# Patient Record
Sex: FEMALE | Race: WHITE | NOT HISPANIC OR LATINO | Employment: FULL TIME | URBAN - METROPOLITAN AREA
[De-identification: names, ages, dates, MRNs, and addresses within clinical notes are randomized per-mention and may not be internally consistent; named-entity substitution may affect disease eponyms.]

---

## 2023-03-29 ENCOUNTER — OFFICE VISIT (OUTPATIENT)
Dept: PRIMARY CARE | Facility: CLINIC | Age: 23
End: 2023-03-29
Payer: COMMERCIAL

## 2023-03-29 VITALS
HEART RATE: 80 BPM | OXYGEN SATURATION: 98 % | HEIGHT: 66 IN | TEMPERATURE: 97 F | WEIGHT: 246 LBS | BODY MASS INDEX: 39.53 KG/M2 | DIASTOLIC BLOOD PRESSURE: 56 MMHG | SYSTOLIC BLOOD PRESSURE: 108 MMHG

## 2023-03-29 DIAGNOSIS — R53.83 OTHER FATIGUE: ICD-10-CM

## 2023-03-29 DIAGNOSIS — E66.01 CLASS 3 SEVERE OBESITY DUE TO EXCESS CALORIES WITHOUT SERIOUS COMORBIDITY WITH BODY MASS INDEX (BMI) OF 40.0 TO 44.9 IN ADULT (MULTI): ICD-10-CM

## 2023-03-29 DIAGNOSIS — Z00.00 WELL ADULT EXAM: Primary | ICD-10-CM

## 2023-03-29 PROBLEM — R00.2 PALPITATIONS: Status: ACTIVE | Noted: 2023-03-29

## 2023-03-29 PROBLEM — R49.0 DYSPHONIA: Status: ACTIVE | Noted: 2023-03-29

## 2023-03-29 PROBLEM — E66.9 OBESITY: Status: ACTIVE | Noted: 2023-03-29

## 2023-03-29 PROBLEM — M54.2 DORSALGIA OF CERVICOTHORACIC REGION: Status: ACTIVE | Noted: 2023-03-29

## 2023-03-29 PROBLEM — R30.0 DYSURIA: Status: ACTIVE | Noted: 2023-03-29

## 2023-03-29 PROBLEM — N89.8 VAGINAL DISCHARGE: Status: ACTIVE | Noted: 2023-03-29

## 2023-03-29 PROBLEM — L08.9 ABRASIONS OF MULTIPLE SITES WITH INFECTION: Status: ACTIVE | Noted: 2023-03-29

## 2023-03-29 PROBLEM — M25.562 LEFT KNEE PAIN: Status: ACTIVE | Noted: 2023-03-29

## 2023-03-29 PROBLEM — G56.01 CARPAL TUNNEL SYNDROME OF RIGHT WRIST: Status: ACTIVE | Noted: 2023-03-29

## 2023-03-29 PROBLEM — K21.9 GERD (GASTROESOPHAGEAL REFLUX DISEASE): Status: ACTIVE | Noted: 2023-03-29

## 2023-03-29 PROBLEM — M54.50 DORSALGIA OF LUMBOSACRAL REGION: Status: ACTIVE | Noted: 2023-03-29

## 2023-03-29 PROBLEM — E03.9 HYPOTHYROIDISM: Status: ACTIVE | Noted: 2023-03-29

## 2023-03-29 PROBLEM — R29.898 WEAKNESS OF BOTH HANDS: Status: ACTIVE | Noted: 2023-03-29

## 2023-03-29 PROBLEM — N94.19 FUNCTIONAL DYSPAREUNIA: Status: ACTIVE | Noted: 2023-03-29

## 2023-03-29 PROBLEM — O21.9 NAUSEA AND VOMITING OF PREGNANCY, ANTEPARTUM (HHS-HCC): Status: ACTIVE | Noted: 2023-03-29

## 2023-03-29 PROBLEM — T14.8XXA WOUND INFECTION: Status: ACTIVE | Noted: 2023-03-29

## 2023-03-29 PROBLEM — T07.XXXA ABRASIONS OF MULTIPLE SITES WITH INFECTION: Status: ACTIVE | Noted: 2023-03-29

## 2023-03-29 PROBLEM — M54.9 BACK PAIN DURING PREGNANCY IN THIRD TRIMESTER (HHS-HCC): Status: ACTIVE | Noted: 2023-03-29

## 2023-03-29 PROBLEM — S93.409A ANKLE SPRAIN: Status: ACTIVE | Noted: 2023-03-29

## 2023-03-29 PROBLEM — G89.29 CHRONIC MIDLINE LOW BACK PAIN WITHOUT SCIATICA: Status: ACTIVE | Noted: 2023-03-29

## 2023-03-29 PROBLEM — S76.111S: Status: ACTIVE | Noted: 2023-03-29

## 2023-03-29 PROBLEM — R06.02 SHORTNESS OF BREATH ON EXERTION: Status: ACTIVE | Noted: 2023-03-29

## 2023-03-29 PROBLEM — N89.6 TIGHT INTROITUS: Status: ACTIVE | Noted: 2023-03-29

## 2023-03-29 PROBLEM — J45.909 ASTHMA (HHS-HCC): Status: ACTIVE | Noted: 2023-03-29

## 2023-03-29 PROBLEM — N92.6 LATE MENSES: Status: ACTIVE | Noted: 2023-03-29

## 2023-03-29 PROBLEM — N12: Status: ACTIVE | Noted: 2023-03-29

## 2023-03-29 PROBLEM — M25.561 RIGHT KNEE PAIN: Status: ACTIVE | Noted: 2023-03-29

## 2023-03-29 PROBLEM — R20.0 NUMBNESS AND TINGLING IN BOTH HANDS: Status: ACTIVE | Noted: 2023-03-29

## 2023-03-29 PROBLEM — R29.898 IMPAIRED FLEXIBILITY OF LOWER EXTREMITY: Status: ACTIVE | Noted: 2023-03-29

## 2023-03-29 PROBLEM — J38.5 LARYNGOSPASM: Status: ACTIVE | Noted: 2023-03-29

## 2023-03-29 PROBLEM — M54.6 THORACIC BACK PAIN: Status: ACTIVE | Noted: 2023-03-29

## 2023-03-29 PROBLEM — M54.6 DORSALGIA OF CERVICOTHORACIC REGION: Status: ACTIVE | Noted: 2023-03-29

## 2023-03-29 PROBLEM — L08.9 WOUND INFECTION: Status: ACTIVE | Noted: 2023-03-29

## 2023-03-29 PROBLEM — R07.9 CHEST PAIN: Status: ACTIVE | Noted: 2023-03-29

## 2023-03-29 PROBLEM — G56.02 CARPAL TUNNEL SYNDROME OF LEFT WRIST: Status: ACTIVE | Noted: 2023-03-29

## 2023-03-29 PROBLEM — R20.2 NUMBNESS AND TINGLING IN BOTH HANDS: Status: ACTIVE | Noted: 2023-03-29

## 2023-03-29 PROBLEM — F32.A DEPRESSION: Status: ACTIVE | Noted: 2023-03-29

## 2023-03-29 PROBLEM — R10.9 ABDOMINAL PAIN: Status: ACTIVE | Noted: 2023-03-29

## 2023-03-29 PROBLEM — M62.81 TRUNCAL MUSCLE WEAKNESS: Status: ACTIVE | Noted: 2023-03-29

## 2023-03-29 PROBLEM — N92.6 IRREGULAR MENSES: Status: ACTIVE | Noted: 2023-03-29

## 2023-03-29 PROBLEM — O26.843 UTERINE SIZE-DATE DISCREPANCY IN THIRD TRIMESTER (HHS-HCC): Status: ACTIVE | Noted: 2023-03-29

## 2023-03-29 PROBLEM — O26.893 BACK PAIN DURING PREGNANCY IN THIRD TRIMESTER (HHS-HCC): Status: ACTIVE | Noted: 2023-03-29

## 2023-03-29 PROBLEM — M99.09 SEGMENTAL AND SOMATIC DYSFUNCTION: Status: ACTIVE | Noted: 2023-03-29

## 2023-03-29 PROBLEM — N64.4 BREAST PAIN: Status: ACTIVE | Noted: 2023-03-29

## 2023-03-29 PROBLEM — J32.9 SINUSITIS: Status: ACTIVE | Noted: 2023-03-29

## 2023-03-29 PROBLEM — N76.0 VULVOVAGINITIS: Status: ACTIVE | Noted: 2023-03-29

## 2023-03-29 PROBLEM — R11.2 NAUSEA WITH VOMITING: Status: ACTIVE | Noted: 2023-03-29

## 2023-03-29 PROBLEM — R42 DIZZINESS: Status: ACTIVE | Noted: 2023-03-29

## 2023-03-29 PROBLEM — R06.00 DYSPNEA: Status: ACTIVE | Noted: 2023-03-29

## 2023-03-29 PROBLEM — M54.50 CHRONIC MIDLINE LOW BACK PAIN WITHOUT SCIATICA: Status: ACTIVE | Noted: 2023-03-29

## 2023-03-29 PROBLEM — R05.9 COUGH: Status: ACTIVE | Noted: 2023-03-29

## 2023-03-29 PROCEDURE — 4004F PT TOBACCO SCREEN RCVD TLK: CPT | Performed by: FAMILY MEDICINE

## 2023-03-29 PROCEDURE — 3008F BODY MASS INDEX DOCD: CPT | Performed by: FAMILY MEDICINE

## 2023-03-29 PROCEDURE — 99395 PREV VISIT EST AGE 18-39: CPT | Performed by: FAMILY MEDICINE

## 2023-03-29 RX ORDER — ALBUTEROL SULFATE 90 UG/1
AEROSOL, METERED RESPIRATORY (INHALATION)
COMMUNITY
Start: 2021-11-09 | End: 2023-10-30 | Stop reason: ALTCHOICE

## 2023-03-29 ASSESSMENT — PATIENT HEALTH QUESTIONNAIRE - PHQ9
2. FEELING DOWN, DEPRESSED OR HOPELESS: MORE THAN HALF THE DAYS
SUM OF ALL RESPONSES TO PHQ9 QUESTIONS 1 AND 2: 2
1. LITTLE INTEREST OR PLEASURE IN DOING THINGS: NOT AT ALL
10. IF YOU CHECKED OFF ANY PROBLEMS, HOW DIFFICULT HAVE THESE PROBLEMS MADE IT FOR YOU TO DO YOUR WORK, TAKE CARE OF THINGS AT HOME, OR GET ALONG WITH OTHER PEOPLE: SOMEWHAT DIFFICULT

## 2023-03-29 NOTE — PROGRESS NOTES
Subjective   Patient ID: Magdalena Guillory is a 22 y.o. female who presents for Annual Exam (Work physical).  HPI  Insert concerns today:None    In general the patient states that her health is:Good    Regular dental visits:Needs to see dentst  Vision problems: no problems  Hearing loss:no problems    Diet:Trying well balance  Exercise:Started daily  Weight concerns:Yes    Contraception:iUD  Sexual activity:yes  Menstrual problems:none  Pregnancy history:     Cervical cancer screening:  Last Pap and pelvic was done this year and has IUD  No history of abnormal Pap smear    Breast cancer screening:  Regular self breast exams:yes  Any changes in the breast:no       Do you feel safe at home?:yes  Review of Systems  No other complaints  Objective   Physical Exam  General: Patient is alert and oriented ×3 and appears in no acute distress. No respiratory distress.    Head: Atraumatic normocephalic.    Eyes: EOMI, PERRLA      Ears: Canals patent without any irritation, tympanic membranes without inflammation, no swelling, normal light reflex.    Nose: Nares patent. Turbinates are not swollen. No discharge.    Mouth: Normal mucosa. Moist. No erythema, exudates, tonsillar enlargement.    Neck: Normal range of motion, no masses.  Thyroid is palpable and normal in size without any nodules. No anterior cervical or posterior cervical adenopathy.    Heart: Regular rate and rhythm, no murmurs clicks or gallops    Lungs: Clear to auscultation bilaterally without any rhonchi rales or wheezing, lung sounds heard throughout all lung fields    Abdomen: Soft, nontender, no rigidity, rebound, guarding or organomegaly. Bowel sounds ×4 quadrants.    Musculoskeletal: Normal range of motion, strength is grossly intact in the proximal distal muscles of the upper and lower extremities bilaterally, deep tendon reflexes +2 out of 4 and symmetric bilaterally at the patella, Achilles, biceps, triceps, sensation intact.    Nerves: Cranial nerves  II through XII appear grossly intact and without deficit    Skin: Intact, dry, no rashes or erythema    Psych: Normal affect.   Assessment/Plan   Problem List Items Addressed This Visit       Obesity     Other Visit Diagnoses       Well adult exam    -  Primary    Relevant Orders    Lipid Panel    Vitamin D 1,25 Dihydroxy    Other fatigue        Relevant Orders    CBC and Auto Differential    Comprehensive Metabolic Panel    Lipid Panel    Triiodothyronine, Free    Thyroxine, Free    TSH with reflex to Free T4 if abnormal    Vitamin B12    Vitamin D 1,25 Dihydroxy        The patient is a well 22-year-old female  Anticipatory guidance given  Labs were ordered  Discussed morbid obesity.  Patient is patient is working on exercising now and diet.

## 2023-04-06 ENCOUNTER — TELEPHONE (OUTPATIENT)
Dept: PRIMARY CARE | Facility: CLINIC | Age: 23
End: 2023-04-06

## 2023-04-06 ENCOUNTER — APPOINTMENT (OUTPATIENT)
Dept: LAB | Facility: LAB | Age: 23
End: 2023-04-06
Payer: COMMERCIAL

## 2023-04-06 NOTE — TELEPHONE ENCOUNTER
Pt called states she went to [facility] to get her labs done and they couldn't do them said the dr placed them external and not internal. Can you please call her and email her the current orders or fax them to facility. Let her know.

## 2023-04-11 NOTE — TELEPHONE ENCOUNTER
Printed them up and called and asked Magdalena where she wants me to send them. I am faxing to the [facility]

## 2023-04-14 ENCOUNTER — APPOINTMENT (OUTPATIENT)
Dept: LAB | Facility: LAB | Age: 23
End: 2023-04-14
Payer: COMMERCIAL

## 2023-04-14 LAB
ALANINE AMINOTRANSFERASE (SGPT) (U/L) IN SER/PLAS: 50 U/L (ref 7–45)
ALBUMIN (G/DL) IN SER/PLAS: 4.8 G/DL (ref 3.4–5)
ALKALINE PHOSPHATASE (U/L) IN SER/PLAS: 89 U/L (ref 33–110)
ANION GAP IN SER/PLAS: 15 MMOL/L (ref 10–20)
ASPARTATE AMINOTRANSFERASE (SGOT) (U/L) IN SER/PLAS: 26 U/L (ref 9–39)
BASOPHILS (10*3/UL) IN BLOOD BY AUTOMATED COUNT: 0.03 X10E9/L (ref 0–0.1)
BASOPHILS/100 LEUKOCYTES IN BLOOD BY AUTOMATED COUNT: 0.3 % (ref 0–2)
BILIRUBIN TOTAL (MG/DL) IN SER/PLAS: 0.7 MG/DL (ref 0–1.2)
CALCIUM (MG/DL) IN SER/PLAS: 9.7 MG/DL (ref 8.6–10.3)
CARBON DIOXIDE, TOTAL (MMOL/L) IN SER/PLAS: 22 MMOL/L (ref 21–32)
CHLORIDE (MMOL/L) IN SER/PLAS: 105 MMOL/L (ref 98–107)
CHOLESTEROL (MG/DL) IN SER/PLAS: 199 MG/DL (ref 0–199)
CHOLESTEROL IN HDL (MG/DL) IN SER/PLAS: 32.7 MG/DL
CHOLESTEROL/HDL RATIO: 6.1
COBALAMIN (VITAMIN B12) (PG/ML) IN SER/PLAS: 336 PG/ML (ref 211–911)
CREATININE (MG/DL) IN SER/PLAS: 0.56 MG/DL (ref 0.5–1.05)
EOSINOPHILS (10*3/UL) IN BLOOD BY AUTOMATED COUNT: 0.07 X10E9/L (ref 0–0.7)
EOSINOPHILS/100 LEUKOCYTES IN BLOOD BY AUTOMATED COUNT: 0.7 % (ref 0–6)
ERYTHROCYTE DISTRIBUTION WIDTH (RATIO) BY AUTOMATED COUNT: 13 % (ref 11.5–14.5)
ERYTHROCYTE MEAN CORPUSCULAR HEMOGLOBIN CONCENTRATION (G/DL) BY AUTOMATED: 32.4 G/DL (ref 32–36)
ERYTHROCYTE MEAN CORPUSCULAR VOLUME (FL) BY AUTOMATED COUNT: 79 FL (ref 80–100)
ERYTHROCYTES (10*6/UL) IN BLOOD BY AUTOMATED COUNT: 5.17 X10E12/L (ref 4–5.2)
GFR FEMALE: >90 ML/MIN/1.73M2
GLUCOSE (MG/DL) IN SER/PLAS: 96 MG/DL (ref 74–99)
HEMATOCRIT (%) IN BLOOD BY AUTOMATED COUNT: 40.8 % (ref 36–46)
HEMOGLOBIN (G/DL) IN BLOOD: 13.2 G/DL (ref 12–16)
IMMATURE GRANULOCYTES/100 LEUKOCYTES IN BLOOD BY AUTOMATED COUNT: 0.6 % (ref 0–0.9)
LDL: 115 MG/DL (ref 0–119)
LEUKOCYTES (10*3/UL) IN BLOOD BY AUTOMATED COUNT: 9.9 X10E9/L (ref 4.4–11.3)
LYMPHOCYTES (10*3/UL) IN BLOOD BY AUTOMATED COUNT: 2.04 X10E9/L (ref 1.2–4.8)
LYMPHOCYTES/100 LEUKOCYTES IN BLOOD BY AUTOMATED COUNT: 20.6 % (ref 13–44)
MONOCYTES (10*3/UL) IN BLOOD BY AUTOMATED COUNT: 0.61 X10E9/L (ref 0.1–1)
MONOCYTES/100 LEUKOCYTES IN BLOOD BY AUTOMATED COUNT: 6.1 % (ref 2–10)
NEUTROPHILS (10*3/UL) IN BLOOD BY AUTOMATED COUNT: 7.11 X10E9/L (ref 1.2–7.7)
NEUTROPHILS/100 LEUKOCYTES IN BLOOD BY AUTOMATED COUNT: 71.7 % (ref 40–80)
NON HDL CHOLESTEROL: 166 MG/DL (ref 0–149)
PLATELETS (10*3/UL) IN BLOOD AUTOMATED COUNT: 338 X10E9/L (ref 150–450)
POTASSIUM (MMOL/L) IN SER/PLAS: 3.5 MMOL/L (ref 3.5–5.3)
PROTEIN TOTAL: 7.5 G/DL (ref 6.4–8.2)
SODIUM (MMOL/L) IN SER/PLAS: 138 MMOL/L (ref 136–145)
THYROTROPIN (MIU/L) IN SER/PLAS BY DETECTION LIMIT <= 0.05 MIU/L: 2.29 MIU/L (ref 0.44–3.98)
THYROXINE (T4) FREE (NG/DL) IN SER/PLAS: 0.96 NG/DL (ref 0.61–1.12)
TRIGLYCERIDE (MG/DL) IN SER/PLAS: 256 MG/DL (ref 0–149)
TRIIODOTHYRONINE (T3) FREE (PG/ML) IN SER/PLAS: 4.1 PG/ML (ref 2.3–4.2)
UREA NITROGEN (MG/DL) IN SER/PLAS: 11 MG/DL (ref 6–23)
VLDL: 51 MG/DL (ref 0–40)

## 2023-04-17 ENCOUNTER — TELEPHONE (OUTPATIENT)
Dept: PRIMARY CARE | Facility: CLINIC | Age: 23
End: 2023-04-17
Payer: COMMERCIAL

## 2023-04-17 LAB — VITAMIN D 1,25-DIHYDROXY: 59.1 PG/ML (ref 19.9–79.3)

## 2023-04-17 NOTE — TELEPHONE ENCOUNTER
Patient called about lab results, apparently her lipids are out of range. She did make an appt this week to discuss with you but didn't know if you could review them and if she would even need an appt?

## 2023-04-17 NOTE — TELEPHONE ENCOUNTER
----- Message from Noel Kelly DO sent at 4/16/2023  9:18 PM EDT -----  Please let the patient know that her blood count was normal but it looks like she may be heading towards iron deficiency, B12 was on the low end of normal and I would suggest taking B12, thyroid was normal, cholesterol was elevated and I would suggest decreasing breads, pastas and sweets.

## 2023-04-17 NOTE — RESULT ENCOUNTER NOTE
Please let the patient know that her blood count was normal but it looks like she may be heading towards iron deficiency, B12 was on the low end of normal and I would suggest taking B12, thyroid was normal, cholesterol was elevated and I would suggest decreasing breads, pastas and sweets.

## 2023-04-17 NOTE — TELEPHONE ENCOUNTER
Magdalena left a message on rx line, she would like to schedule an appointment to follow up with Dr Kelly regarding blood work results.

## 2023-04-17 NOTE — PROGRESS NOTES
Pt states that she drank a cherry coke right before her getting her blood drawn, she totally forgot not to drink before hand. She states she didn't eat 2 days before and was needing to get something in her system.

## 2023-04-19 ENCOUNTER — APPOINTMENT (OUTPATIENT)
Dept: PRIMARY CARE | Facility: CLINIC | Age: 23
End: 2023-04-19
Payer: COMMERCIAL

## 2023-08-03 ENCOUNTER — OFFICE VISIT (OUTPATIENT)
Dept: PRIMARY CARE | Facility: CLINIC | Age: 23
End: 2023-08-03
Payer: COMMERCIAL

## 2023-08-03 VITALS
BODY MASS INDEX: 36.8 KG/M2 | WEIGHT: 229 LBS | OXYGEN SATURATION: 98 % | DIASTOLIC BLOOD PRESSURE: 68 MMHG | SYSTOLIC BLOOD PRESSURE: 106 MMHG | HEART RATE: 70 BPM | HEIGHT: 66 IN

## 2023-08-03 DIAGNOSIS — M25.531 RIGHT WRIST PAIN: Primary | ICD-10-CM

## 2023-08-03 PROBLEM — M25.579 ANKLE PAIN: Status: ACTIVE | Noted: 2022-09-15
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
## 2023-08-03 PROCEDURE — 4004F PT TOBACCO SCREEN RCVD TLK: CPT | Performed by: FAMILY MEDICINE

## 2023-08-03 PROCEDURE — 3008F BODY MASS INDEX DOCD: CPT | Performed by: FAMILY MEDICINE

## 2023-08-03 PROCEDURE — 99213 OFFICE O/P EST LOW 20 MIN: CPT | Performed by: FAMILY MEDICINE

## 2023-08-03 RX ORDER — NAPROXEN 500 MG/1
500 TABLET ORAL 2 TIMES DAILY PRN
Qty: 28 TABLET | Refills: 0 | Status: SHIPPED | OUTPATIENT
Start: 2023-08-03 | End: 2023-08-17

## 2023-08-03 RX ORDER — SERTRALINE HYDROCHLORIDE 20 MG/ML
SOLUTION ORAL EVERY 24 HOURS
COMMUNITY
End: 2023-10-30 | Stop reason: ALTCHOICE

## 2023-08-03 NOTE — LETTER
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  On Monday 8/21/23 can restart normal activities.    If you have any questions or concerns, please don't hesitate to call.         Sincerely,        Noel Kelly, DO    CC: No Recipients

## 2023-08-03 NOTE — PROGRESS NOTES
Subjective   Patient ID: Magdalena Guillory is a 22 y.o. female who presents for Wrist Pain.  HPI  Having right wrist pain.  States it hurts towards the 5th digit.  Not getting tingling any more. 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 She notices it worse after work and at night.  Tylenol and ibuprofen don't helep.  Icing.      Review of Systems    Objective   Physical Exam  General: Patient is alert and orient x3 appears in no acute distress    Heart: Regular rate and rhythm no murmurs clicks or gallops    Lungs: Clear to auscultation bilateral without [place] wheezing    Extremities: No cyanosis clubbing or edema    Musculoskeletal: Strength is grossly intact at 5 out of 5, normal range of motion in the right wrist, there is some tenderness, no swelling  Assessment/Plan   Problem List Items Addressed This Visit    None  Visit Diagnoses       Right wrist pain    -  Primary    Relevant Medications    naproxen (Naprosyn) 500 mg tablet    Other Relevant Orders    XR wrist right 3+ views (Completed)

## 2023-08-07 ENCOUNTER — TELEPHONE (OUTPATIENT)
Dept: PRIMARY CARE | Facility: CLINIC | Age: 23
End: 2023-08-07
Payer: COMMERCIAL

## 2023-08-07 NOTE — TELEPHONE ENCOUNTER
----- Message from Noel Kelly DO sent at 8/6/2023  6:15 PM EDT -----  Pleaset let the patient know that the Xray of the wrist was normal

## 2023-08-08 NOTE — TELEPHONE ENCOUNTER
----- Message from Nicky Kim RN sent at 8/8/2023  8:12 AM EDT -----    ----- Message -----  From: Noel Kelly DO  Sent: 8/6/2023   6:15 PM EDT  To: Katie Yarbrough MA    Pleaset let the patient know that the Xray of the wrist was normal

## 2023-10-05 ENCOUNTER — TELEMEDICINE (OUTPATIENT)
Dept: PRIMARY CARE | Facility: CLINIC | Age: 23
End: 2023-10-05
Payer: COMMERCIAL

## 2023-10-05 DIAGNOSIS — A08.4 VIRAL GASTROENTERITIS: Primary | ICD-10-CM

## 2023-10-05 PROCEDURE — 99442 PR PHYS/QHP TELEPHONE EVALUATION 11-20 MIN: CPT | Performed by: FAMILY MEDICINE

## 2023-10-05 RX ORDER — ONDANSETRON 4 MG/1
4 TABLET, FILM COATED ORAL EVERY 8 HOURS PRN
Qty: 20 TABLET | Refills: 0 | Status: SHIPPED | OUTPATIENT
Start: 2023-10-05 | End: 2023-10-10

## 2023-10-05 NOTE — PROGRESS NOTES
Subjective   Patient ID: Magdalena Guillory is a 23 y.o. female who presents for nausea and dizzy.  HPI  Child had viral illness. She is having nausea and dizziness.  She woke at 5 with nausea and vomiting.  Went to work and drank.  She had vi tea and this re aggravated it.    Temp 100.2 and 99.3.  Getting burning sensations in the top of the stomach and lower.  She felt like it was harder to urinate.  Has drank some water 4 bottle and zulma.  She had a child who had GI virus.  Diarrhea that is watery.        Review of Systems    Objective   Physical Exam    Assessment/Plan   Problem List Items Addressed This Visit    None  Visit Diagnoses       Viral gastroenteritis    -  Primary    Relevant Medications    ondansetron (Zofran) 4 mg tablet        Patient was instructed to start brat diet  Increase fluids  Use electrolyte replacement  Suggested elderberry 3 times a day and probiotics  Given Zofran for the nausea  Call if symptoms are worsening

## 2023-10-05 NOTE — LETTER
October 5, 2023     Patient: Magdalena Guillory   YOB: 2000   Date of Visit: 10/5/2023       To Whom It May Concern:    Magdalena Guillory was seen in my clinic on 10/5/2023 at 5:00 pm. Please excuse Magdalena for her absence from work on this day to make the appointment.     If you have any questions or concerns, please don't hesitate to call.         Sincerely,         Noel Kelly,         CC: No Recipients

## 2023-10-30 ENCOUNTER — OFFICE VISIT (OUTPATIENT)
Dept: PRIMARY CARE | Facility: CLINIC | Age: 23
End: 2023-10-30
Payer: COMMERCIAL

## 2023-10-30 VITALS
BODY MASS INDEX: 35.03 KG/M2 | OXYGEN SATURATION: 99 % | TEMPERATURE: 97.3 F | SYSTOLIC BLOOD PRESSURE: 122 MMHG | HEIGHT: 66 IN | DIASTOLIC BLOOD PRESSURE: 78 MMHG | WEIGHT: 218 LBS | HEART RATE: 92 BPM

## 2023-10-30 DIAGNOSIS — R42 LIGHTHEADED: ICD-10-CM

## 2023-10-30 DIAGNOSIS — R10.826 EPIGASTRIC ABDOMINAL TENDERNESS WITH REBOUND TENDERNESS: Primary | ICD-10-CM

## 2023-10-30 DIAGNOSIS — R11.2 NAUSEA AND VOMITING, UNSPECIFIED VOMITING TYPE: ICD-10-CM

## 2023-10-30 PROCEDURE — 4004F PT TOBACCO SCREEN RCVD TLK: CPT | Performed by: FAMILY MEDICINE

## 2023-10-30 PROCEDURE — 3008F BODY MASS INDEX DOCD: CPT | Performed by: FAMILY MEDICINE

## 2023-10-30 PROCEDURE — 99214 OFFICE O/P EST MOD 30 MIN: CPT | Performed by: FAMILY MEDICINE

## 2023-10-30 RX ORDER — QUETIAPINE FUMARATE 50 MG/1
50 TABLET, FILM COATED ORAL NIGHTLY
COMMUNITY
Start: 2023-09-14 | End: 2024-02-28 | Stop reason: ALTCHOICE

## 2023-10-30 RX ORDER — OMEPRAZOLE 40 MG/1
40 CAPSULE, DELAYED RELEASE ORAL
Qty: 30 CAPSULE | Refills: 0 | Status: SHIPPED | OUTPATIENT
Start: 2023-10-30 | End: 2024-02-28 | Stop reason: ALTCHOICE

## 2023-10-30 ASSESSMENT — PATIENT HEALTH QUESTIONNAIRE - PHQ9
1. LITTLE INTEREST OR PLEASURE IN DOING THINGS: SEVERAL DAYS
SUM OF ALL RESPONSES TO PHQ9 QUESTIONS 1 AND 2: 2
2. FEELING DOWN, DEPRESSED OR HOPELESS: SEVERAL DAYS

## 2023-10-30 NOTE — PROGRESS NOTES
Subjective   Patient ID: Magdalena Guillory is a 23 y.o. female who presents for Dizziness and GI Problem (Going on for  about a month.).  HPI  Dizziness  Lightheaded and dizzy. Happening since the beginning of OCT 2023 and went to well now. Had orthostatic blood pressures done and normal.  Urine showed protein.  She was drinking a lot of water bottles(6 daily).  Still drinking several glasses a day.  She will get it after standing for 10 to 15 min.  Sleep is bad due to children.      Gi issues.  Wake every morning and pukes.  It stings and burns.  She has had increased pickines and having changes in appetite.  Denies pregnancy. She has liquid stools after constipation.  She gets weird pain that starts in the side and then goes down the right side.  She  tries not to eat past 10:00.   No blood or coffe ground material.    Review of Systems    Objective   Physical Exam  General: Patient is alert and orient x3 appears in no acute distress.  Denies any suicidal or homicidal ideations.    Eyes: EOMI, PERRLA    Neck: No adenopathy    Mouth: Normal mucosa    Heart: Regular rate and rhythm no murmurs clicks or gallops    Lungs: Clear to auscultation bilateral without rhonchi rales or wheezing    Abdomen: Soft, tenderness in the epigastric region and the right upper quadrant, negative Sanabria's, negative McBurney's.    Extremities: No cyanosis clubbing or edema.  Assessment/Plan   Problem List Items Addressed This Visit       Nausea with vomiting    Relevant Orders    Comprehensive metabolic panel    CBC and Auto Differential    Amylase    Lipase    H. Pylori Breath Test    US gallbladder     Other Visit Diagnoses       Epigastric abdominal tenderness with rebound tenderness    -  Primary    Relevant Orders    Comprehensive metabolic panel    CBC and Auto Differential    Amylase    Lipase    H. Pylori Breath Test    US gallbladder    Lightheaded            Lightheadedness  - Adrenal support  -Vitamin C  -Continue to stay  hydrated    Epigastric tenderness and also nausea and vomiting  -Labs ordered  - Ultrasound was ordered  - Start digestive enzymes with each meal with high enzymes with each meal  - Started on omeprazole 40 mg once a day for 30 days.

## 2023-10-31 ENCOUNTER — LAB (OUTPATIENT)
Dept: LAB | Facility: LAB | Age: 23
End: 2023-10-31
Payer: COMMERCIAL

## 2023-10-31 ENCOUNTER — ANCILLARY PROCEDURE (OUTPATIENT)
Dept: RADIOLOGY | Facility: CLINIC | Age: 23
End: 2023-10-31
Payer: COMMERCIAL

## 2023-10-31 DIAGNOSIS — K76.0 FATTY LIVER: Primary | ICD-10-CM

## 2023-10-31 DIAGNOSIS — R11.2 NAUSEA AND VOMITING, UNSPECIFIED VOMITING TYPE: ICD-10-CM

## 2023-10-31 DIAGNOSIS — R10.826 EPIGASTRIC ABDOMINAL TENDERNESS WITH REBOUND TENDERNESS: ICD-10-CM

## 2023-10-31 LAB
ALBUMIN SERPL BCP-MCNC: 4.4 G/DL (ref 3.4–5)
ALP SERPL-CCNC: 77 U/L (ref 33–110)
ALT SERPL W P-5'-P-CCNC: 16 U/L (ref 7–45)
AMYLASE SERPL-CCNC: 24 U/L (ref 29–103)
ANION GAP SERPL CALC-SCNC: 12 MMOL/L (ref 10–20)
AST SERPL W P-5'-P-CCNC: 12 U/L (ref 9–39)
BASOPHILS # BLD AUTO: 0.05 X10*3/UL (ref 0–0.1)
BASOPHILS NFR BLD AUTO: 0.7 %
BILIRUB SERPL-MCNC: 0.4 MG/DL (ref 0–1.2)
BUN SERPL-MCNC: 8 MG/DL (ref 6–23)
CALCIUM SERPL-MCNC: 9.3 MG/DL (ref 8.6–10.3)
CHLORIDE SERPL-SCNC: 108 MMOL/L (ref 98–107)
CO2 SERPL-SCNC: 22 MMOL/L (ref 21–32)
CREAT SERPL-MCNC: 0.53 MG/DL (ref 0.5–1.05)
EOSINOPHIL # BLD AUTO: 0.08 X10*3/UL (ref 0–0.7)
EOSINOPHIL NFR BLD AUTO: 1 %
ERYTHROCYTE [DISTWIDTH] IN BLOOD BY AUTOMATED COUNT: 13.7 % (ref 11.5–14.5)
GFR SERPL CREATININE-BSD FRML MDRD: >90 ML/MIN/1.73M*2
GLUCOSE SERPL-MCNC: 83 MG/DL (ref 74–99)
HCT VFR BLD AUTO: 37.7 % (ref 36–46)
HGB BLD-MCNC: 12.3 G/DL (ref 12–16)
IMM GRANULOCYTES # BLD AUTO: 0.03 X10*3/UL (ref 0–0.7)
IMM GRANULOCYTES NFR BLD AUTO: 0.4 % (ref 0–0.9)
LIPASE SERPL-CCNC: 28 U/L (ref 9–82)
LYMPHOCYTES # BLD AUTO: 2.32 X10*3/UL (ref 1.2–4.8)
LYMPHOCYTES NFR BLD AUTO: 30.2 %
MCH RBC QN AUTO: 26.3 PG (ref 26–34)
MCHC RBC AUTO-ENTMCNC: 32.6 G/DL (ref 32–36)
MCV RBC AUTO: 81 FL (ref 80–100)
MONOCYTES # BLD AUTO: 0.36 X10*3/UL (ref 0.1–1)
MONOCYTES NFR BLD AUTO: 4.7 %
NEUTROPHILS # BLD AUTO: 4.84 X10*3/UL (ref 1.2–7.7)
NEUTROPHILS NFR BLD AUTO: 63 %
NRBC BLD-RTO: 0 /100 WBCS (ref 0–0)
PLATELET # BLD AUTO: 269 X10*3/UL (ref 150–450)
PMV BLD AUTO: 11.7 FL (ref 7.5–11.5)
POTASSIUM SERPL-SCNC: 4 MMOL/L (ref 3.5–5.3)
PROT SERPL-MCNC: 6.5 G/DL (ref 6.4–8.2)
RBC # BLD AUTO: 4.68 X10*6/UL (ref 4–5.2)
SODIUM SERPL-SCNC: 138 MMOL/L (ref 136–145)
WBC # BLD AUTO: 7.7 X10*3/UL (ref 4.4–11.3)

## 2023-10-31 PROCEDURE — 76705 ECHO EXAM OF ABDOMEN: CPT | Performed by: RADIOLOGY

## 2023-10-31 PROCEDURE — 76705 ECHO EXAM OF ABDOMEN: CPT

## 2023-10-31 PROCEDURE — 83013 H PYLORI (C-13) BREATH: CPT

## 2023-10-31 PROCEDURE — 82150 ASSAY OF AMYLASE: CPT

## 2023-10-31 PROCEDURE — 85025 COMPLETE CBC W/AUTO DIFF WBC: CPT

## 2023-10-31 PROCEDURE — 36415 COLL VENOUS BLD VENIPUNCTURE: CPT

## 2023-10-31 PROCEDURE — 80053 COMPREHEN METABOLIC PANEL: CPT

## 2023-10-31 PROCEDURE — 83690 ASSAY OF LIPASE: CPT

## 2023-11-01 ENCOUNTER — TELEPHONE (OUTPATIENT)
Dept: PRIMARY CARE | Facility: CLINIC | Age: 23
End: 2023-11-01
Payer: COMMERCIAL

## 2023-11-01 LAB — UREA BREATH TEST QL: NEGATIVE

## 2023-11-01 NOTE — RESULT ENCOUNTER NOTE
Please let the patient know that her ultrasound showed slightly echogenic liver suggestive of fatty infiltration. No gallstones.    Elastogram ordered

## 2023-11-01 NOTE — TELEPHONE ENCOUNTER
----- Message from Noel Kelly DO sent at 11/1/2023 11:25 AM EDT -----  Please let the patient know that her ultrasound showed slightly echogenic liver suggestive of fatty infiltration. No gallstones.    Elastogram ordered

## 2023-11-06 ENCOUNTER — HOSPITAL ENCOUNTER (OUTPATIENT)
Dept: RADIOLOGY | Facility: HOSPITAL | Age: 23
Discharge: HOME | End: 2023-11-06
Payer: COMMERCIAL

## 2023-11-06 ENCOUNTER — APPOINTMENT (OUTPATIENT)
Dept: RADIOLOGY | Facility: HOSPITAL | Age: 23
End: 2023-11-06
Payer: COMMERCIAL

## 2023-11-06 DIAGNOSIS — K76.0 FATTY LIVER: ICD-10-CM

## 2023-11-09 ENCOUNTER — OFFICE VISIT (OUTPATIENT)
Dept: PRIMARY CARE | Facility: CLINIC | Age: 23
End: 2023-11-09
Payer: COMMERCIAL

## 2023-11-09 VITALS
OXYGEN SATURATION: 99 % | RESPIRATION RATE: 16 BRPM | HEART RATE: 76 BPM | BODY MASS INDEX: 35.03 KG/M2 | SYSTOLIC BLOOD PRESSURE: 120 MMHG | HEIGHT: 66 IN | DIASTOLIC BLOOD PRESSURE: 78 MMHG | WEIGHT: 218 LBS

## 2023-11-09 DIAGNOSIS — I31.39 PERICARDIAL EFFUSION (HHS-HCC): ICD-10-CM

## 2023-11-09 DIAGNOSIS — K65.4 IDIOPATHIC SCLEROSING MESENTERITIS (MULTI): Primary | ICD-10-CM

## 2023-11-09 PROCEDURE — 4004F PT TOBACCO SCREEN RCVD TLK: CPT | Performed by: FAMILY MEDICINE

## 2023-11-09 PROCEDURE — 3008F BODY MASS INDEX DOCD: CPT | Performed by: FAMILY MEDICINE

## 2023-11-09 PROCEDURE — 99214 OFFICE O/P EST MOD 30 MIN: CPT | Performed by: FAMILY MEDICINE

## 2023-11-09 RX ORDER — PREDNISONE 10 MG/1
TABLET ORAL
Qty: 21 TABLET | Refills: 0 | Status: SHIPPED | OUTPATIENT
Start: 2023-11-09 | End: 2023-11-15

## 2023-11-09 ASSESSMENT — ENCOUNTER SYMPTOMS
SHORTNESS OF BREATH: 1
CHEST TIGHTNESS: 0
NERVOUS/ANXIOUS: 1
FREQUENCY: 1
FEVER: 0
CHILLS: 0
ALLERGIC/IMMUNOLOGIC NEGATIVE: 1
ABDOMINAL PAIN: 0
NEUROLOGICAL NEGATIVE: 1
HEMATOLOGIC/LYMPHATIC NEGATIVE: 1
BACK PAIN: 1

## 2023-11-09 NOTE — PROGRESS NOTES
"Subjective   Patient ID: Magdalena Guillory is a 23 y.o. female who presents for Follow-up (ER).    HPI   Patient was in the ER yesterday with swollen lymph nodes, pericardial effusion, and fatty infiltration of the liver. The patient states that her pain is not as bad today. Noted CVA tenderness in the ER. Pain is currently in her back. Patient notes that she uses the bathroom frequently. CT scan showed mesenteritis. Patient was given muscle relaxer and lidocaine patch. Muscle relaxer worked but patch didn't. Patient only received at the hospital and was not discharged on anything.   Review of Systems   Constitutional:  Negative for chills and fever.   Respiratory:  Positive for shortness of breath. Negative for chest tightness.    Cardiovascular:  Positive for chest pain.   Gastrointestinal:  Negative for abdominal pain.   Genitourinary:  Positive for frequency.   Musculoskeletal:  Positive for back pain.   Skin: Negative.    Allergic/Immunologic: Negative.    Neurological: Negative.    Hematological: Negative.    Psychiatric/Behavioral:  The patient is nervous/anxious.        Objective   /78   Pulse 76   Resp 16   Ht 1.67 m (5' 5.75\")   Wt 98.9 kg (218 lb)   SpO2 99%   BMI 35.45 kg/m²     Physical Exam  Constitutional:       Appearance: Normal appearance. She is normal weight.   Cardiovascular:      Rate and Rhythm: Normal rate and regular rhythm.      Pulses: Normal pulses.      Heart sounds: Normal heart sounds.   Pulmonary:      Effort: Pulmonary effort is normal.      Breath sounds: Normal breath sounds.   Abdominal:      Tenderness: There is abdominal tenderness. There is right CVA tenderness and left CVA tenderness. There is no guarding.   Musculoskeletal:         General: Normal range of motion.   Skin:     General: Skin is warm and dry.   Neurological:      General: No focal deficit present.      Mental Status: She is alert and oriented to person, place, and time. Mental status is at baseline. "         Assessment/Plan   S/P ER visit for back and abdominal pain   - Prednisone given    2.  Small pericardial effusion  - echo ordered

## 2023-11-14 ENCOUNTER — ANCILLARY PROCEDURE (OUTPATIENT)
Dept: RADIOLOGY | Facility: HOSPITAL | Age: 23
End: 2023-11-14
Payer: COMMERCIAL

## 2023-11-14 PROCEDURE — 76981 USE PARENCHYMA: CPT

## 2023-11-14 PROCEDURE — 76981 USE PARENCHYMA: CPT | Performed by: RADIOLOGY

## 2023-11-16 ENCOUNTER — TELEPHONE (OUTPATIENT)
Dept: PRIMARY CARE | Facility: CLINIC | Age: 23
End: 2023-11-16
Payer: COMMERCIAL

## 2023-11-21 ENCOUNTER — HOSPITAL ENCOUNTER (OUTPATIENT)
Dept: CARDIOLOGY | Facility: CLINIC | Age: 23
Discharge: HOME | End: 2023-11-21
Payer: COMMERCIAL

## 2023-11-21 ENCOUNTER — TELEPHONE (OUTPATIENT)
Dept: PRIMARY CARE | Facility: CLINIC | Age: 23
End: 2023-11-21

## 2023-11-21 DIAGNOSIS — I31.39 PERICARDIAL EFFUSION (HHS-HCC): ICD-10-CM

## 2023-11-21 LAB
AORTIC VALVE PEAK VELOCITY: 1.36
AV PEAK GRADIENT: 7.4
AVA (PEAK VEL): 2.86
EJECTION FRACTION APICAL 4 CHAMBER: 59.9
EJECTION FRACTION: 59
LEFT ATRIUM VOLUME AREA LENGTH INDEX BSA: 37.9
LEFT VENTRICLE INTERNAL DIMENSION DIASTOLE: 5.31 (ref 3.5–6)
LEFT VENTRICULAR OUTFLOW TRACT DIAMETER: 2.06
MITRAL VALVE E/A RATIO: 1.57
MITRAL VALVE E/E' RATIO: 5.44
RIGHT VENTRICLE FREE WALL PEAK S': 14
RIGHT VENTRICLE PEAK SYSTOLIC PRESSURE: 20.8
TRICUSPID ANNULAR PLANE SYSTOLIC EXCURSION: 2.7

## 2023-11-21 PROCEDURE — 93306 TTE W/DOPPLER COMPLETE: CPT | Performed by: INTERNAL MEDICINE

## 2023-11-21 PROCEDURE — 93306 TTE W/DOPPLER COMPLETE: CPT

## 2023-11-21 NOTE — RESULT ENCOUNTER NOTE
Please let the patient know that her echocardiogram was normal.  There was a trivial pericardial effusion which means it so small it is nonsignificant

## 2023-11-21 NOTE — TELEPHONE ENCOUNTER
----- Message from Noel Kelly, DO sent at 11/21/2023 12:00 PM EST -----  Please let the patient know that her echocardiogram was normal.  There was a trivial pericardial effusion which means it so small it is nonsignificant

## 2024-01-09 ENCOUNTER — APPOINTMENT (OUTPATIENT)
Dept: GASTROENTEROLOGY | Facility: CLINIC | Age: 24
End: 2024-01-09
Payer: COMMERCIAL

## 2024-02-25 NOTE — PROGRESS NOTES
Hepatology: Initial Office Note     Patient: Magdalena Guillory, a 23 y.o. year old female presents for steatotic liver disease.   PCP: Noel Kelly, DO    History of Present Illness   Magdalena Guillory presents for evaluation of steatotic liver disease.     Based on chart review, patient has been referred for evaluation of steatosis in the liver as well as concerns of abnormal elastography results.  On conversation with the patient, she verbalized that she was under the impression that she does not have fatty liver and that her liver is otherwise doing fine without concerns. I attempted to discuss with the patient the findings on her test results done by her provider care team which prompted this referral.  Patient again verbalized that she was told that there were no issues with regards to fatty liver.    No elevation of liver enzymes on labs.  Had an ultrasound liver elastography a few months ago which raise concern for compensated chronic liver disease.  Patient denies a diagnosis of liver fibrosis or cirrhosis.  Denies symptoms of right upper quadrant abdominal pain, nausea, vomiting, jaundice, abdominal distention, confusion.     Patient did not engage in detail regarding her nutritional habits.  She noted that she generally eats healthy, occasionally will eat fast food.  She reported having issues with her bowel habits.  Notes that she moves her bowels once every few days and this is associated with straining.  Occasional episodes of watery stools.  Denies having rectal bleeding or mucus in stools.  Denies change in appetite.  Denies weight loss.    Review of Systems   Constitutional/ General: No fever, no night sweats, no weight loss  Eyes: no yellow discoloration  ENT: normal   Cardiovascular: no chest pain, no palpitations  Respiratory: no shortness of breath  Gastrointestinal: denies abdominal pain, nausea, vomiting  Integumentary: no rashes, no yellow discoloration of skin  Neurologic: no weakness or  numbness of extremities  Psychiatric: Denies mood fluctuations  Musculoskeletal: no joint swelling   Genitourinary: no dysuria, no hematuria    All other systems have been reviewed and are negative except as noted in the HPI and above.    PMHx: dysmenorrhea, dyspareunia, asthma, hypothyroidism  PSHx: C section  Social hx: denies hx of alcohol use, + hx of smoking, denies hx of illicit substance use, hx of marijuana use present..   Family hx: mother, brother and maternal grandmother with hx of fatty liver (based on chart review). No known hx of cirrhosis or HCC.  Per patient maternal aunt has a history of PBC and maternal grandfather liver issues- possibly cancer.     Medications     Current Outpatient Medications   Medication Instructions    omeprazole (PRILOSEC) 40 mg, oral, Daily before breakfast, Do not crush or chew.    QUEtiapine (SEROQUEL) 50 mg, oral, Nightly        Physical Examination     Vitals:    02/28/24 1308   BP: 134/76   Pulse: 79   Resp: 16   Temp: 36.4 °C (97.5 °F)     Vitals:    02/28/24 1308   Weight: 95.3 kg (210 lb)     Body mass index is 32.89 kg/m².    Constitutional: awake, alert   Eyes: EOMI, anicteric sclera  ENT: no oropharyngeal lesions visualized  Respiratory: Bilateral air entry equal no wheezing  Cardiovascular: regular rate and rhythm, no lower extremity edema  Abdomen: soft, non tender, non distended, no free fluid wave appreciated, bowel sounds present  Integumentary: no wounds on examined skin   Musculoskeletal: no joint swelling   Neurologic: gross motor strength intact   Psychiatric: alert, in some parts of this evaluation- patient's mood was labile, oriented to time/place/person    Labs     Lab Results   Component Value Date     10/31/2023    K 4.0 10/31/2023    CREATININE 0.53 10/31/2023    ALBUMIN 4.4 10/31/2023    ALT 16 10/31/2023    AST 12 10/31/2023    ALKPHOS 77 10/31/2023    HGB 12.3 10/31/2023     10/31/2023      Nov 2023: ALT 14, AST 13    Imaging   Nov  2023: US liver elastography: IMPRESSION: Median liver stiffness 1.74 m/sec. Suggestive of cACLD but need further test for confirmation    US GB Oct 2023: IMPRESSION: Slightly echogenic liver suggestive of fatty infiltration. No gallstones    OSH imaging  Nov 2023: CTAP w/o contrast: IMPRESSION:   Diffuse hypoattenuating liver suggesting steatosis.   No evidence of urolithiasis or obstructive uropathy.   Conspicuous number of small and mild prominent mesenteric lymph nodes .    Findings may be postinflammatory, reactive given the patient's age.   Mild haziness of mesenteric fat central abdomen and left anterior abdomen and pelvis.  Findings are considered nonspecific, possibly inflammatory/infectious, such as mesenteritis.   Small pericardial effusion.     Assessment and Plan    Magdalena Guillory, a 23 y.o. year old female presents for evaluation of steatotic liver disease. I have reviewed pertinent provider notes, labs and imaging studies. Discussed results and their interpretation with the patient today.    Encounter Diagnoses   Name Primary?    Hepatic steatosis Yes    Constipation, unspecified constipation type     Liver fibrosis         Based on imaging results of ultrasound and CT noncontrast study in 2023 there is evidence of steatosis in the liver.  Ultrasound elastography which was performed in November 2023 had an elevated median liver stiffness score raising concern for liver fibrosis (based on elastography score liver stiffness is estimated to be around stage II/III).   I reviewed these results with the patient today.  She does have risk factors for metabolic dysfunction associated steatotic liver disease, these include elevated BMI and increased triglyceride levels.  In addition there is reportedly a family history of steatotic liver disease as well.       Discussed with patient pathophysiology of metabolism associated steatotic liver disease, MASLD.    Discussed potential of disease progression to  steatohepatitis, liver fibrosis, cirrhosis and risk of HCC.    Encourage patient to target weight loss of at least 7 to 10% body weight in the next 6 to 12 months.    Discussed dietary modifications for fatty liver disease, increasing proportion of grains and vegetables, opting for leaner proteins and decreasing proportion of simple carbohydrates.    Discussed exclusion/elimination of poor caloric value foods including sodas, cakes, candy, ice cream, crackers, chips etc.    Recommendation:   -Check of liver enzymes  -Recommend check of labs to screen for chronic hepatitis B and hepatitis C.  Recommend check of serologies for hepatitis a and B to determine prior exposure/immunity.  -Recommend check of TTG IgA, total IgA, ceruloplasmin.  -Recommend further investigation for liver fibrosis assessment with noninvasive serologic markers such as ELF/enhanced liver fibrosis score.  -Recommend fibrosis assessment with enhanced liver fibrosis score.   -Recommend avoidance of hepatotoxic agents including alcohol, herbal supplements, NSAIDs.     # Constipation: Discussed with patient to use over-the-counter osmotic laxative such as MiraLAX 17 g daily.  Advise increasing soluble fiber intake and adequate hydration.    Follow up visit in 1 year or earlier if needed based on test results.

## 2024-02-28 ENCOUNTER — OFFICE VISIT (OUTPATIENT)
Dept: GASTROENTEROLOGY | Facility: HOSPITAL | Age: 24
End: 2024-02-28
Payer: COMMERCIAL

## 2024-02-28 VITALS
WEIGHT: 210 LBS | TEMPERATURE: 97.5 F | SYSTOLIC BLOOD PRESSURE: 134 MMHG | BODY MASS INDEX: 32.96 KG/M2 | HEIGHT: 67 IN | RESPIRATION RATE: 16 BRPM | DIASTOLIC BLOOD PRESSURE: 76 MMHG | HEART RATE: 79 BPM

## 2024-02-28 DIAGNOSIS — K59.00 CONSTIPATION, UNSPECIFIED CONSTIPATION TYPE: ICD-10-CM

## 2024-02-28 DIAGNOSIS — K76.0 HEPATIC STEATOSIS: Primary | ICD-10-CM

## 2024-02-28 DIAGNOSIS — K74.00 LIVER FIBROSIS: ICD-10-CM

## 2024-02-28 PROCEDURE — 99204 OFFICE O/P NEW MOD 45 MIN: CPT | Performed by: STUDENT IN AN ORGANIZED HEALTH CARE EDUCATION/TRAINING PROGRAM

## 2024-02-28 PROCEDURE — 3008F BODY MASS INDEX DOCD: CPT | Performed by: STUDENT IN AN ORGANIZED HEALTH CARE EDUCATION/TRAINING PROGRAM

## 2024-02-28 PROCEDURE — 99214 OFFICE O/P EST MOD 30 MIN: CPT | Performed by: STUDENT IN AN ORGANIZED HEALTH CARE EDUCATION/TRAINING PROGRAM

## 2024-02-28 NOTE — PATIENT INSTRUCTIONS
Magdalena Guillory,     Thank you for coming in for your hepatology office visit today. As per our discussion, I recommend:     Have labs done for further evaluation of the liver.   I recommend taking over the counter Miralax 17gm or 1 scoop with 8-10 ounces of water for your bowels, to help improve symptoms of straining and frequency of your BMs.     I would like to see you back in the office in a year or earlier if needed based on test results.   Office visit scheduling number is [phone].   If you have any trouble or need assistance with having this done, please reach out to my 's office at [phone] (Ms Pamela Loera) or my nurse coordinator at [phone] (Ms Jessica ESCALANTE).     I look forward to seeing you again. Thank you for allowing me to participate in your care.     Sincerely,  Dona Cartagena MD  Hepatology

## 2024-03-05 ENCOUNTER — APPOINTMENT (OUTPATIENT)
Dept: GASTROENTEROLOGY | Facility: CLINIC | Age: 24
End: 2024-03-05
Payer: COMMERCIAL

## 2024-04-02 ENCOUNTER — APPOINTMENT (OUTPATIENT)
Dept: GASTROENTEROLOGY | Facility: CLINIC | Age: 24
End: 2024-04-02
Payer: COMMERCIAL

## 2024-04-09 ENCOUNTER — APPOINTMENT (OUTPATIENT)
Dept: GASTROENTEROLOGY | Facility: CLINIC | Age: 24
End: 2024-04-09
Payer: COMMERCIAL

## 2024-09-09 ENCOUNTER — OFFICE VISIT (OUTPATIENT)
Dept: PRIMARY CARE | Facility: CLINIC | Age: 24
End: 2024-09-09
Payer: COMMERCIAL

## 2024-09-09 VITALS
TEMPERATURE: 97.6 F | SYSTOLIC BLOOD PRESSURE: 110 MMHG | BODY MASS INDEX: 30.76 KG/M2 | DIASTOLIC BLOOD PRESSURE: 66 MMHG | HEART RATE: 64 BPM | HEIGHT: 67 IN | WEIGHT: 196 LBS | OXYGEN SATURATION: 98 %

## 2024-09-09 DIAGNOSIS — Z00.00 WELL ADULT EXAM: Primary | ICD-10-CM

## 2024-09-09 PROCEDURE — 99395 PREV VISIT EST AGE 18-39: CPT | Performed by: FAMILY MEDICINE

## 2024-09-09 PROCEDURE — 3008F BODY MASS INDEX DOCD: CPT | Performed by: FAMILY MEDICINE

## 2024-09-09 ASSESSMENT — PATIENT HEALTH QUESTIONNAIRE - PHQ9
1. LITTLE INTEREST OR PLEASURE IN DOING THINGS: NOT AT ALL
2. FEELING DOWN, DEPRESSED OR HOPELESS: NOT AT ALL
SUM OF ALL RESPONSES TO PHQ9 QUESTIONS 1 AND 2: 0

## 2024-09-09 NOTE — PROGRESS NOTES
Subjective   Patient ID: Magdalena Guillory is a 24 y.o. female who presents for Annual Exam (Fill out paperwork).  HPI  Diet is good  Feels like she is active  Has been losing weight.  Using better eating habits  Sleep: 5-6 hours a night.    [facility]  No abnormal PAP smear  Same partner and monogamous  Contraception- Copper IUD      Review of Systems    Objective   Physical Exam  General: Patient is alert and oriented ×3 and appears in no acute distress. No respiratory distress.    Head: Atraumatic normocephalic.    Eyes: EOMI, PERRLA      Ears: Canals patent without any irritation, tympanic membranes without inflammation, no swelling, normal light reflex.    Nose: Nares patent. Turbinates are not swollen. No discharge.    Mouth: Normal mucosa. Moist. No erythema, exudates, tonsillar enlargement.    Neck: Normal range of motion, no masses.  Thyroid is palpable and normal in size without any nodules. No anterior cervical or posterior cervical adenopathy.    Heart: Regular rate and rhythm, no murmurs clicks or gallops    Lungs: Clear to auscultation bilaterally without any rhonchi rales or wheezing, lung sounds heard throughout all lung fields    Abdomen: Soft, nontender, no rigidity, rebound, guarding or organomegaly. Bowel sounds ×4 quadrants.    Musculoskeletal: Normal range of motion, strength is grossly intact in the proximal distal muscles of the upper and lower extremities bilaterally, deep tendon reflexes +2 out of 4 and symmetric bilaterally at the patella, Achilles, biceps, triceps, sensation intact.    Nerves: Cranial nerves II through XII appear grossly intact and without deficit    Skin: Intact, dry, no rashes or erythema    Psych: Normal affect.  Assessment/Plan   Problem List Items Addressed This Visit    None  Visit Diagnoses       Well adult exam    -  Primary        The patient is a well 44-year-old female  Anticipatory guidance given  Up-to-date on vaccinations  Instructed to follow-up  as needed or in 1 year

## 2024-12-03 ENCOUNTER — APPOINTMENT (OUTPATIENT)
Dept: RADIOLOGY | Facility: HOSPITAL | Age: 24
End: 2024-12-03

## 2024-12-03 ENCOUNTER — HOSPITAL ENCOUNTER (EMERGENCY)
Facility: HOSPITAL | Age: 24
Discharge: HOME | End: 2024-12-03

## 2024-12-03 VITALS
SYSTOLIC BLOOD PRESSURE: 113 MMHG | BODY MASS INDEX: 30.92 KG/M2 | TEMPERATURE: 97.6 F | OXYGEN SATURATION: 100 % | DIASTOLIC BLOOD PRESSURE: 72 MMHG | RESPIRATION RATE: 18 BRPM | HEIGHT: 67 IN | HEART RATE: 60 BPM | WEIGHT: 197 LBS

## 2024-12-03 DIAGNOSIS — J06.9 VIRAL URI: Primary | ICD-10-CM

## 2024-12-03 LAB
APPEARANCE UR: CLEAR
BILIRUB UR STRIP.AUTO-MCNC: NEGATIVE MG/DL
COLOR UR: NORMAL
FLUAV RNA RESP QL NAA+PROBE: NOT DETECTED
FLUBV RNA RESP QL NAA+PROBE: NOT DETECTED
GLUCOSE UR STRIP.AUTO-MCNC: NORMAL MG/DL
HCG UR QL IA.RAPID: NEGATIVE
KETONES UR STRIP.AUTO-MCNC: NEGATIVE MG/DL
LEUKOCYTE ESTERASE UR QL STRIP.AUTO: NEGATIVE
NITRITE UR QL STRIP.AUTO: NEGATIVE
PH UR STRIP.AUTO: 5.5 [PH]
PROT UR STRIP.AUTO-MCNC: NEGATIVE MG/DL
RBC # UR STRIP.AUTO: NEGATIVE /UL
S PYO DNA THROAT QL NAA+PROBE: NOT DETECTED
SARS-COV-2 RNA RESP QL NAA+PROBE: NOT DETECTED
SP GR UR STRIP.AUTO: 1.02
UROBILINOGEN UR STRIP.AUTO-MCNC: NORMAL MG/DL

## 2024-12-03 PROCEDURE — 81025 URINE PREGNANCY TEST: CPT

## 2024-12-03 PROCEDURE — 87636 SARSCOV2 & INF A&B AMP PRB: CPT

## 2024-12-03 PROCEDURE — 87651 STREP A DNA AMP PROBE: CPT

## 2024-12-03 PROCEDURE — 81003 URINALYSIS AUTO W/O SCOPE: CPT

## 2024-12-03 PROCEDURE — 71045 X-RAY EXAM CHEST 1 VIEW: CPT

## 2024-12-03 PROCEDURE — 99284 EMERGENCY DEPT VISIT MOD MDM: CPT

## 2024-12-03 PROCEDURE — 71045 X-RAY EXAM CHEST 1 VIEW: CPT | Performed by: RADIOLOGY

## 2024-12-03 ASSESSMENT — PAIN DESCRIPTION - LOCATION: LOCATION: GENERALIZED

## 2024-12-03 ASSESSMENT — LIFESTYLE VARIABLES
HAVE PEOPLE ANNOYED YOU BY CRITICIZING YOUR DRINKING: NO
EVER FELT BAD OR GUILTY ABOUT YOUR DRINKING: NO
HAVE YOU EVER FELT YOU SHOULD CUT DOWN ON YOUR DRINKING: NO
EVER HAD A DRINK FIRST THING IN THE MORNING TO STEADY YOUR NERVES TO GET RID OF A HANGOVER: NO
TOTAL SCORE: 0

## 2024-12-03 ASSESSMENT — COLUMBIA-SUICIDE SEVERITY RATING SCALE - C-SSRS
6. HAVE YOU EVER DONE ANYTHING, STARTED TO DO ANYTHING, OR PREPARED TO DO ANYTHING TO END YOUR LIFE?: NO
2. HAVE YOU ACTUALLY HAD ANY THOUGHTS OF KILLING YOURSELF?: NO
1. IN THE PAST MONTH, HAVE YOU WISHED YOU WERE DEAD OR WISHED YOU COULD GO TO SLEEP AND NOT WAKE UP?: NO

## 2024-12-03 ASSESSMENT — PAIN - FUNCTIONAL ASSESSMENT: PAIN_FUNCTIONAL_ASSESSMENT: 0-10

## 2024-12-03 ASSESSMENT — PAIN SCALES - GENERAL
PAINLEVEL_OUTOF10: 4
PAINLEVEL_OUTOF10: 5 - MODERATE PAIN

## 2024-12-03 ASSESSMENT — PAIN DESCRIPTION - PAIN TYPE: TYPE: ACUTE PAIN

## 2024-12-03 NOTE — ED PROVIDER NOTES
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  The patient has a nonproductive cough, has had intermittent sore throat that has currently resolved.  Patient states that she has had a lack of appetite, endorses a 4 out of 10 generalized body ache.  Patient denies any shortness of breath or chest pain, exam is largely nontoxic.      History provided by:  Patient   used: No         PmHx, PsHx, Allergies, Family Hx, social Hx reviewed as documented    Given the focused nature of the complaint, only related components review of systems were evaluated, and abnormalities indicated in HPI    Physical Exam:    General: Patient is AAOx3, appears well developed, well nourished, is a good historian, answers questions appropriately    HEENT: head normocephalic, atraumatic, PERRLA, EOMs intact, oropharynx with mild erythema, buccal mucosa intact without lesions, TMs unremarkable, nose is patent bilateral    Pulmonary: CTAB, no accessory muscle use, able to speak full clear sentences    Cardiac: HRRR, no murmurs, rubs or gallops    GI: soft, non-tender, non-distended    Musculoskeletal: full weight bearing, PASTOR, no joint effusions, clubbing or edema noted    Skin: intact, no lesions or rashes noted, turgor is good.    Medical Decision Making  This patient was seen in the emergency department with an attending physician available at all times throughout their ED course    Primary consideration for this patient given her presentation is a viral syndrome, strep throat, COVID-19, influenza, pneumonia to name a few.  Chest x-ray, viral swabbing, strep test will be used to further evaluate.  Patient denies the need for any analgesic at this time.  Patient offered IV  hydration and states that she can just drink water without difficulty    The patient's ED course is negative for any acute abnormality, consistent with a viral URI.  Patient verbalized understanding of symptom management at home    Patient is amenable to the plan of discharge as outlined above, all patient's questions pertaining to their ED course were answered in their entirety.  Strict return precautions were discussed with the patient and they verbalized understanding.  Further, it was made clear to the patient that from an emergent basis, all effort and testing was done to eliminate any imminent dangerous or potentially dangerous conditions of the patient however if their symptoms get much worse or feel life-threatening, they are to return to the emergency department or call 911 immediately.    Amount and/or Complexity of Data Reviewed  Labs: ordered. Decision-making details documented in ED Course.       Diagnoses as of 12/03/24 1515   Viral URI       The patient has had the following imaging during this ER visit: XR CHEST 1 VIEW     Patient History   Past Medical History:   Diagnosis Date    Personal history of other endocrine, nutritional and metabolic disease 05/18/2017    History of obesity    Personal history of other mental and behavioral disorders     History of anxiety    Personal history of other mental and behavioral disorders     History of depression     Past Surgical History:   Procedure Laterality Date    FOOT SURGERY  05/26/2017    Foot Surgery    OTHER SURGICAL HISTORY  07/13/2021    Episiotomy repair     Family History   Problem Relation Name Age of Onset    Diabetes Mother      Other (fatty liver) Mother      Neuropathy Mother      Diabetes Brother      Other (fatty liver) Brother      Diabetes Maternal Grandmother      Other (fatty liver) Maternal Grandmother      Hypertension Maternal Grandfather      Cancer Maternal Grandfather      Hypertension Other Aunt     Cancer Other Uncle      Social  "History     Tobacco Use    Smoking status: Former     Types: Cigarettes    Smokeless tobacco: Never    Tobacco comments:     Using vapes   Vaping Use    Vaping status: Every Day   Substance Use Topics    Alcohol use: Never    Drug use: Never     Comment: Using THC       ED Triage Vitals   Temperature Heart Rate Respirations BP   12/03/24 1204 12/03/24 1204 12/03/24 1204 12/03/24 1204   36.6 °C (97.9 °F) 75 16 114/75      Pulse Ox Temp Source Heart Rate Source Patient Position   12/03/24 1204 12/03/24 1204 -- 12/03/24 1425   98 % Skin  Sitting      BP Location FiO2 (%)     12/03/24 1425 --     Right arm        Vitals:    12/03/24 1204 12/03/24 1425   BP: 114/75 113/72   BP Location:  Right arm   Patient Position:  Sitting   Pulse: 75 60   Resp: 16 18   Temp: 36.6 °C (97.9 °F) 36.4 °C (97.6 °F)   TempSrc: Skin    SpO2: 98% 100%   Weight: 89.4 kg (197 lb)    Height: 1.702 m (5' 7\")                Van Andrews, JUAREZ-CNP  12/03/24 1515    "

## 2024-12-03 NOTE — ED TRIAGE NOTES
Pt presents for flu like symptoms. It started yesterday with a headache. States she has felt all over weakness for approx 1 week. She has had a sore throat, decreased appetite, chills and fever at 100.1. She has had a productive cough with yellow mucus. She took tylenol around 0800.

## 2024-12-03 NOTE — Clinical Note
Magdalena Guillory was seen and treated in our emergency department on 12/3/2024.  She may return to work on 12/04/2024.       If you have any questions or concerns, please don't hesitate to call.      Van Andrews, APRN-CNP

## 2024-12-04 LAB — HOLD SPECIMEN: NORMAL

## 2025-03-28 ENCOUNTER — APPOINTMENT (OUTPATIENT)
Dept: URGENT CARE | Age: 25
End: 2025-03-28
Payer: COMMERCIAL

## 2025-04-01 ENCOUNTER — TELEPHONE (OUTPATIENT)
Dept: PRIMARY CARE | Facility: CLINIC | Age: 25
End: 2025-04-01
Payer: COMMERCIAL

## 2025-04-01 NOTE — TELEPHONE ENCOUNTER
Patient has been having vomiting and diarrhea for a couple days, likely norovirus. She is following a bland diet but wanted to know any suggestions of things she can try to help with symptoms

## 2025-06-02 ENCOUNTER — APPOINTMENT (OUTPATIENT)
Dept: PRIMARY CARE | Facility: CLINIC | Age: 25
End: 2025-06-02
Payer: COMMERCIAL

## 2025-06-02 DIAGNOSIS — F31.9 BIPOLAR DEPRESSION (MULTI): Primary | ICD-10-CM

## 2025-06-02 DIAGNOSIS — R63.0 ANOREXIA: ICD-10-CM

## 2025-06-02 DIAGNOSIS — F60.3 BORDERLINE PERSONALITY DISORDER (MULTI): ICD-10-CM

## 2025-06-02 DIAGNOSIS — E55.9 VITAMIN D DEFICIENCY: ICD-10-CM

## 2025-06-02 PROCEDURE — 99214 OFFICE O/P EST MOD 30 MIN: CPT | Performed by: FAMILY MEDICINE

## 2025-06-02 RX ORDER — FLUOXETINE 20 MG/1
20 CAPSULE ORAL DAILY
Qty: 30 CAPSULE | Refills: 1 | Status: SHIPPED | OUTPATIENT
Start: 2025-06-02 | End: 2025-08-01

## 2025-06-02 RX ORDER — QUETIAPINE FUMARATE 50 MG/1
50 TABLET, FILM COATED ORAL NIGHTLY
Qty: 30 TABLET | Refills: 1 | Status: SHIPPED | OUTPATIENT
Start: 2025-06-02 | End: 2025-08-01

## 2025-06-02 NOTE — PROGRESS NOTES
Subjective   Patient ID: Magdalena Guillory is a 24 y.o. female who presents for No chief complaint on file..  HPI  History of Present Illness  The patient presents via virtual visit for evaluation of bipolar disorder, anxiety, depression, and PTSD.    She is seeking to resume medication management for her BPD, anxiety, bipolar disorder, depression, and PTSD. She has expressed a preference for psychiatric care but is open to initiating treatment with her current provider. She has been informed that she requires a mood stabilizer in conjunction with an antidepressant. Despite significant progress in her counseling and therapy, she acknowledges the need for psychiatric intervention. She has previously responded well to Seroquel and Prozac, which improved her sleep and mood respectively. However, she experienced a manic episode on 2 unidentified medications in 2023. Lexapro and Zoloft were ineffective, with Lexapro providing slightly more relief than Zoloft. She reports frequent irritability and anger, which she finds distressing. She has not yet identified a potential psychiatrist. She has not tried Abilify. She associates her manic episodes more with her BPD than her bipolar disorder, describing them as periods of anger, excessive spending, insomnia, and increased caffeine intake. Her most severe manic episode involved aggressive behavior towards others, but she has not exhibited this behavior recently. She also experiences loud outbursts and screaming during these episodes.    She reports recent sleep disturbances, including restlessness, waking up at unusual hours, and difficulty falling back asleep. She also reports irregular eating habits, often skipping breakfast and lunch during the week but consuming lunch and dinner on weekends. She experiences stomach pain from certain foods and has had episodes of nausea at the sight of food. She has been experiencing weekly episodes of diarrhea and vomiting every Monday,  lasting almost all day, accompanied by stomach pain. She has not eaten today due to nausea. She has never been diagnosed with an eating disorder but suspects she may have had one in the past. She works in a  where she is allowed to eat the provided meals, but she often skips lunch due to lack of appetite. She avoids eating before 11 AM as it causes nausea and stomach pain. She typically consumes small containers of soup and either Sprite or root beer during her break between 12:30 PM and 1:30 PM. She drinks a lot of water throughout the day. Her snacks vary depending on the day and usually consist of crackers or vegetables. She has not eaten anything today. She occasionally eats dinner with her children but often eats late at night and snacks late at night. She has noticed weight loss and recalls an episode of overeating that resulted in illness.    Her menstrual cycles are generally regular, but her most recent cycle was longer than usual, lasting 5 to 6 days with heavier bleeding for the first 3 days. She experienced heavy spotting on the 4th and 5th days, with no bleeding during the day but heavy bleeding at night. She had intercourse on the 4th and 5th days of her cycle.    She has also noticed increased hair loss over the past month and a half, initially attributing it to a new shampoo but later realizing it was the same product she had been using.    She reports palpitations when holding her baby or ascending stairs too quickly. She does not consume caffeine daily but admits to consuming 6 to 9 shots of espresso once or twice a month.    GYNECOLOGICAL HISTORY:  - Duration: 5 to 6 days  - Frequency and Flow: Heavier bleeding for the first 3 days, heavy spotting on the 4th and 5th days    SOCIAL HISTORY  She works in a .     Results       Review of Systems    Objective      Physical Exam  Physical Exam  General: Patient appears to have lost weight.  Assessment/Plan   Assessment & Plan  1. Bipolar  Disorder.  - History of bipolar disorder with previous positive response to Seroquel and Prozac.  - Seroquel prescribed for nighttime use to aid sleep; low dose of fluoxetine initiated.  - Effectiveness of medications to be evaluated over the next 4 weeks.  - Advised to seek psychiatric care for further management.    2. Anxiety.  - Reports ongoing anxiety.  - Low dose of paroxetine initiated to manage symptoms.  - Effectiveness of medication to be evaluated over the next 4 weeks.  - Advised to continue with counseling sessions.    3. Depression.  - History of depression with previous positive response to Prozac.  - Low dose of paroxetine initiated to manage depressive symptoms.  - Effectiveness of medication to be evaluated over the next 4 weeks.  - Advised to seek psychiatric care for further management.    4. Post-Traumatic Stress Disorder (PTSD).  - Reports ongoing PTSD symptoms.  - Low dose of paroxetine initiated to manage symptoms.  - Effectiveness of medication to be evaluated over the next 4 weeks.  - Advised to continue with counseling sessions.    5. Insomnia.  - Reports difficulty sleeping, including tossing and turning, waking up at odd hours, and inability to fall back asleep.  - Seroquel prescribed for nighttime use to aid sleep.  - Effectiveness of medication to be evaluated over the next 4 weeks.  - Advised to monitor sleep patterns and report any issues.    6. Eating Disorder.  - Reports symptoms suggestive of an eating disorder, including restricted eating, stomach pain, nausea, and weight loss.  - Laboratory tests ordered to assess nutritional status, including electrolytes, protein levels, and iron levels.  - Advised to monitor eating habits and report any significant changes.  - Discussion of symptoms and potential diagnosis of an eating disorder.    7. Hair Loss.  - Reports recent hair loss.  - Laboratory tests ordered to assess nutritional status, including electrolytes, protein levels, and  iron levels.  - Advised to monitor hair loss and report any significant changes.  - Discussion of potential causes of hair loss.    8. Palpitations.  - Reports palpitations, especially after consuming large amounts of caffeine.  - Advised to reduce caffeine intake.  - Discussion of symptoms and potential causes of palpitations.  - Monitoring of palpitations and reporting any significant changes.    Follow-up  - Follow-up scheduled in 4 weeks to evaluate medication effectiveness and overall progress.     Problem List Items Addressed This Visit    None  Visit Diagnoses         Bipolar depression (Multi)    -  Primary    Relevant Medications    QUEtiapine (SeroqueL) 50 mg tablet    FLUoxetine (PROzac) 20 mg capsule      Borderline personality disorder (Multi)          Vitamin D deficiency        Relevant Orders    Vitamin D 25-Hydroxy,Total (for eval of Vitamin D levels)      Anorexia        Relevant Orders    CBC and Auto Differential    Comprehensive Metabolic Panel    TSH with reflex to Free T4 if abnormal    Vitamin B12    Magnesium    Phosphorus    Ferritin    Iron and TIBC            This medical note was created with the assistance of artificial intelligence (AI) for documentation purposes. The content has been reviewed and confirmed by the healthcare provider for accuracy and completeness. Patient consented to the use of audio recording and use of AI during their visit.

## 2025-08-11 DIAGNOSIS — F31.9 BIPOLAR DEPRESSION (MULTI): ICD-10-CM

## 2025-08-11 RX ORDER — QUETIAPINE FUMARATE 50 MG/1
50 TABLET, FILM COATED ORAL NIGHTLY
Qty: 30 TABLET | Refills: 1 | Status: SHIPPED | OUTPATIENT
Start: 2025-08-11 | End: 2025-10-10

## 2025-08-11 RX ORDER — FLUOXETINE 20 MG/1
20 CAPSULE ORAL DAILY
Qty: 30 CAPSULE | Refills: 1 | Status: SHIPPED | OUTPATIENT
Start: 2025-08-11 | End: 2025-10-10

## 2025-08-14 LAB
25(OH)D3+25(OH)D2 SERPL-MCNC: 33 NG/ML (ref 30–100)
ALBUMIN SERPL-MCNC: 4.6 G/DL (ref 3.6–5.1)
ALP SERPL-CCNC: 56 U/L (ref 31–125)
ALT SERPL-CCNC: 8 U/L (ref 6–29)
ANION GAP SERPL CALCULATED.4IONS-SCNC: 6 MMOL/L (CALC) (ref 7–17)
AST SERPL-CCNC: 9 U/L (ref 10–30)
BASOPHILS # BLD AUTO: 31 CELLS/UL (ref 0–200)
BASOPHILS NFR BLD AUTO: 0.5 %
BILIRUB SERPL-MCNC: 0.7 MG/DL (ref 0.2–1.2)
BUN SERPL-MCNC: 9 MG/DL (ref 7–25)
CALCIUM SERPL-MCNC: 9.2 MG/DL (ref 8.6–10.2)
CHLORIDE SERPL-SCNC: 105 MMOL/L (ref 98–110)
CO2 SERPL-SCNC: 28 MMOL/L (ref 20–32)
CREAT SERPL-MCNC: 0.49 MG/DL (ref 0.5–0.96)
EGFRCR SERPLBLD CKD-EPI 2021: 135 ML/MIN/1.73M2
EOSINOPHIL # BLD AUTO: 143 CELLS/UL (ref 15–500)
EOSINOPHIL NFR BLD AUTO: 2.3 %
ERYTHROCYTE [DISTWIDTH] IN BLOOD BY AUTOMATED COUNT: 12.4 % (ref 11–15)
FERRITIN SERPL-MCNC: 12 NG/ML (ref 16–154)
GLUCOSE SERPL-MCNC: 75 MG/DL (ref 65–99)
HCT VFR BLD AUTO: 35.6 % (ref 35–45)
HGB BLD-MCNC: 11.6 G/DL (ref 11.7–15.5)
IRON SATN MFR SERPL: 24 % (CALC) (ref 16–45)
IRON SERPL-MCNC: 92 MCG/DL (ref 40–190)
LYMPHOCYTES # BLD AUTO: 1872 CELLS/UL (ref 850–3900)
LYMPHOCYTES NFR BLD AUTO: 30.2 %
MAGNESIUM SERPL-MCNC: 2.2 MG/DL (ref 1.5–2.5)
MCH RBC QN AUTO: 27.8 PG (ref 27–33)
MCHC RBC AUTO-ENTMCNC: 32.6 G/DL (ref 32–36)
MCV RBC AUTO: 85.4 FL (ref 80–100)
MONOCYTES # BLD AUTO: 384 CELLS/UL (ref 200–950)
MONOCYTES NFR BLD AUTO: 6.2 %
NEUTROPHILS # BLD AUTO: 3770 CELLS/UL (ref 1500–7800)
NEUTROPHILS NFR BLD AUTO: 60.8 %
PHOSPHATE SERPL-MCNC: 3.8 MG/DL (ref 2.5–4.5)
PLATELET # BLD AUTO: 232 THOUSAND/UL (ref 140–400)
PMV BLD REES-ECKER: 11.3 FL (ref 7.5–12.5)
POTASSIUM SERPL-SCNC: 4 MMOL/L (ref 3.5–5.3)
PROT SERPL-MCNC: 6.8 G/DL (ref 6.1–8.1)
RBC # BLD AUTO: 4.17 MILLION/UL (ref 3.8–5.1)
SODIUM SERPL-SCNC: 139 MMOL/L (ref 135–146)
TIBC SERPL-MCNC: 382 MCG/DL (CALC) (ref 250–450)
TSH SERPL-ACNC: 0.99 MIU/L
VIT B12 SERPL-MCNC: 286 PG/ML (ref 200–1100)
WBC # BLD AUTO: 6.2 THOUSAND/UL (ref 3.8–10.8)

## 2025-08-19 ENCOUNTER — APPOINTMENT (OUTPATIENT)
Dept: RADIOLOGY | Facility: HOSPITAL | Age: 25
End: 2025-08-19
Payer: COMMERCIAL

## 2025-08-19 ENCOUNTER — HOSPITAL ENCOUNTER (EMERGENCY)
Facility: HOSPITAL | Age: 25
Discharge: HOME | End: 2025-08-19
Payer: COMMERCIAL

## 2025-08-19 VITALS
HEART RATE: 77 BPM | RESPIRATION RATE: 18 BRPM | TEMPERATURE: 97.2 F | HEIGHT: 67 IN | BODY MASS INDEX: 27.47 KG/M2 | SYSTOLIC BLOOD PRESSURE: 120 MMHG | OXYGEN SATURATION: 100 % | DIASTOLIC BLOOD PRESSURE: 72 MMHG | WEIGHT: 175 LBS

## 2025-08-19 DIAGNOSIS — M54.41 ACUTE RIGHT-SIDED LOW BACK PAIN WITH RIGHT-SIDED SCIATICA: Primary | ICD-10-CM

## 2025-08-19 LAB
APPEARANCE UR: CLEAR
BILIRUB UR STRIP.AUTO-MCNC: NEGATIVE MG/DL
COLOR UR: ABNORMAL
GLUCOSE UR STRIP.AUTO-MCNC: NORMAL MG/DL
HCG UR QL IA.RAPID: NEGATIVE
KETONES UR STRIP.AUTO-MCNC: NEGATIVE MG/DL
LEUKOCYTE ESTERASE UR QL STRIP.AUTO: ABNORMAL
MUCOUS THREADS #/AREA URNS AUTO: NORMAL /LPF
NITRITE UR QL STRIP.AUTO: NEGATIVE
PH UR STRIP.AUTO: 6 [PH]
PROT UR STRIP.AUTO-MCNC: NEGATIVE MG/DL
RBC # UR STRIP.AUTO: NEGATIVE MG/DL
RBC #/AREA URNS AUTO: NORMAL /HPF
SP GR UR STRIP.AUTO: 1.02
SQUAMOUS #/AREA URNS AUTO: NORMAL /HPF
UROBILINOGEN UR STRIP.AUTO-MCNC: NORMAL MG/DL
WBC #/AREA URNS AUTO: NORMAL /HPF

## 2025-08-19 PROCEDURE — 87086 URINE CULTURE/COLONY COUNT: CPT | Mod: PORLAB | Performed by: PHYSICIAN ASSISTANT

## 2025-08-19 PROCEDURE — 81025 URINE PREGNANCY TEST: CPT | Performed by: PHYSICIAN ASSISTANT

## 2025-08-19 PROCEDURE — 99284 EMERGENCY DEPT VISIT MOD MDM: CPT

## 2025-08-19 PROCEDURE — 72100 X-RAY EXAM L-S SPINE 2/3 VWS: CPT

## 2025-08-19 PROCEDURE — 2500000001 HC RX 250 WO HCPCS SELF ADMINISTERED DRUGS (ALT 637 FOR MEDICARE OP): Performed by: PHYSICIAN ASSISTANT

## 2025-08-19 PROCEDURE — 72100 X-RAY EXAM L-S SPINE 2/3 VWS: CPT | Performed by: STUDENT IN AN ORGANIZED HEALTH CARE EDUCATION/TRAINING PROGRAM

## 2025-08-19 PROCEDURE — 81001 URINALYSIS AUTO W/SCOPE: CPT | Performed by: PHYSICIAN ASSISTANT

## 2025-08-19 RX ORDER — CYCLOBENZAPRINE HCL 10 MG
10 TABLET ORAL ONCE
Status: COMPLETED | OUTPATIENT
Start: 2025-08-19 | End: 2025-08-19

## 2025-08-19 RX ORDER — ACETAMINOPHEN 325 MG/1
975 TABLET ORAL ONCE
Status: COMPLETED | OUTPATIENT
Start: 2025-08-19 | End: 2025-08-19

## 2025-08-19 RX ORDER — TIZANIDINE 2 MG/1
2 TABLET ORAL EVERY 6 HOURS PRN
Qty: 30 TABLET | Refills: 0 | Status: SHIPPED | OUTPATIENT
Start: 2025-08-19 | End: 2025-08-29

## 2025-08-19 RX ADMIN — CYCLOBENZAPRINE 10 MG: 10 TABLET, FILM COATED ORAL at 06:35

## 2025-08-19 RX ADMIN — ACETAMINOPHEN 975 MG: 325 TABLET ORAL at 06:35

## 2025-08-19 ASSESSMENT — PAIN DESCRIPTION - ORIENTATION: ORIENTATION: RIGHT

## 2025-08-19 ASSESSMENT — PAIN DESCRIPTION - LOCATION: LOCATION: BACK

## 2025-08-19 ASSESSMENT — PAIN SCALES - GENERAL: PAINLEVEL_OUTOF10: 5 - MODERATE PAIN

## 2025-08-19 ASSESSMENT — PAIN - FUNCTIONAL ASSESSMENT: PAIN_FUNCTIONAL_ASSESSMENT: 0-10

## 2025-08-19 ASSESSMENT — PAIN DESCRIPTION - PAIN TYPE: TYPE: ACUTE PAIN

## 2025-08-20 ENCOUNTER — OFFICE VISIT (OUTPATIENT)
Dept: PRIMARY CARE | Facility: CLINIC | Age: 25
End: 2025-08-20
Payer: COMMERCIAL

## 2025-08-20 VITALS
WEIGHT: 175 LBS | OXYGEN SATURATION: 98 % | TEMPERATURE: 97.2 F | HEIGHT: 67 IN | DIASTOLIC BLOOD PRESSURE: 68 MMHG | BODY MASS INDEX: 27.47 KG/M2 | SYSTOLIC BLOOD PRESSURE: 104 MMHG | HEART RATE: 71 BPM

## 2025-08-20 DIAGNOSIS — M54.6 DORSALGIA OF THORACIC REGION: ICD-10-CM

## 2025-08-20 DIAGNOSIS — S33.5XXA LUMBAR SPRAIN, INITIAL ENCOUNTER: Primary | ICD-10-CM

## 2025-08-20 LAB
BACTERIA UR CULT: NORMAL
HOLD SPECIMEN: NORMAL

## 2025-08-20 PROCEDURE — 99214 OFFICE O/P EST MOD 30 MIN: CPT | Performed by: FAMILY MEDICINE

## 2025-08-20 PROCEDURE — 3008F BODY MASS INDEX DOCD: CPT | Performed by: FAMILY MEDICINE

## 2025-08-20 RX ORDER — PREDNISONE 10 MG/1
TABLET ORAL
Qty: 21 TABLET | Refills: 0 | Status: SHIPPED | OUTPATIENT
Start: 2025-08-20 | End: 2025-08-26

## 2025-08-20 RX ORDER — NAPROXEN 500 MG/1
500 TABLET ORAL 2 TIMES DAILY PRN
Qty: 28 TABLET | Refills: 0 | Status: SHIPPED | OUTPATIENT
Start: 2025-08-20 | End: 2025-09-03

## 2025-08-20 ASSESSMENT — PATIENT HEALTH QUESTIONNAIRE - PHQ9
2. FEELING DOWN, DEPRESSED OR HOPELESS: MORE THAN HALF THE DAYS
SUM OF ALL RESPONSES TO PHQ9 QUESTIONS 1 AND 2: 4
1. LITTLE INTEREST OR PLEASURE IN DOING THINGS: MORE THAN HALF THE DAYS

## 2025-08-21 ENCOUNTER — TELEPHONE (OUTPATIENT)
Dept: PRIMARY CARE | Facility: CLINIC | Age: 25
End: 2025-08-21
Payer: COMMERCIAL

## 2025-09-02 ENCOUNTER — EVALUATION (OUTPATIENT)
Dept: PHYSICAL THERAPY | Facility: HOSPITAL | Age: 25
End: 2025-09-02
Payer: COMMERCIAL

## 2025-09-02 ENCOUNTER — APPOINTMENT (OUTPATIENT)
Dept: PRIMARY CARE | Facility: CLINIC | Age: 25
End: 2025-09-02
Payer: COMMERCIAL

## 2025-09-02 DIAGNOSIS — S33.5XXD LUMBAR BACK SPRAIN, SUBSEQUENT ENCOUNTER: Primary | ICD-10-CM

## 2025-09-02 DIAGNOSIS — R29.898 LEG WEAKNESS: ICD-10-CM

## 2025-09-02 DIAGNOSIS — R29.898 WEAKNESS OF LOWER EXTREMITY, UNSPECIFIED LATERALITY: ICD-10-CM

## 2025-09-02 PROCEDURE — 97161 PT EVAL LOW COMPLEX 20 MIN: CPT | Mod: GP | Performed by: PHYSICAL THERAPIST
